# Patient Record
Sex: FEMALE | Race: BLACK OR AFRICAN AMERICAN | NOT HISPANIC OR LATINO | Employment: FULL TIME | URBAN - NONMETROPOLITAN AREA
[De-identification: names, ages, dates, MRNs, and addresses within clinical notes are randomized per-mention and may not be internally consistent; named-entity substitution may affect disease eponyms.]

---

## 2017-12-11 ENCOUNTER — NON-PROVIDER VISIT (OUTPATIENT)
Dept: URGENT CARE | Facility: PHYSICIAN GROUP | Age: 62
End: 2017-12-11

## 2017-12-11 DIAGNOSIS — Z02.1 PRE-EMPLOYMENT DRUG SCREENING: ICD-10-CM

## 2017-12-11 PROCEDURE — 8907 PR URINE COLLECT ONLY: Performed by: NURSE PRACTITIONER

## 2018-03-07 ENCOUNTER — OFFICE VISIT (OUTPATIENT)
Dept: URGENT CARE | Facility: PHYSICIAN GROUP | Age: 63
End: 2018-03-07
Payer: COMMERCIAL

## 2018-03-07 VITALS
HEART RATE: 96 BPM | OXYGEN SATURATION: 95 % | WEIGHT: 238.5 LBS | DIASTOLIC BLOOD PRESSURE: 80 MMHG | HEIGHT: 68 IN | TEMPERATURE: 97.3 F | SYSTOLIC BLOOD PRESSURE: 124 MMHG | RESPIRATION RATE: 16 BRPM | BODY MASS INDEX: 36.15 KG/M2

## 2018-03-07 DIAGNOSIS — E66.9 OBESITY (BMI 30-39.9): ICD-10-CM

## 2018-03-07 DIAGNOSIS — L50.9 HIVES: Primary | ICD-10-CM

## 2018-03-07 PROCEDURE — 99204 OFFICE O/P NEW MOD 45 MIN: CPT | Mod: 25 | Performed by: PHYSICIAN ASSISTANT

## 2018-03-07 RX ORDER — METHYLPREDNISOLONE 4 MG/1
4 TABLET ORAL SEE ADMIN INSTRUCTIONS
Qty: 21 TAB | Refills: 0 | Status: SHIPPED | OUTPATIENT
Start: 2018-03-07 | End: 2022-01-19

## 2018-03-07 RX ORDER — LEVOTHYROXINE SODIUM 0.03 MG/1
25 TABLET ORAL
COMMUNITY

## 2018-03-07 RX ORDER — FLUOXETINE HYDROCHLORIDE 20 MG/1
20 CAPSULE ORAL DAILY
COMMUNITY
End: 2022-01-19

## 2018-03-07 RX ORDER — HYDROXYZINE HYDROCHLORIDE 25 MG/1
25 TABLET, FILM COATED ORAL 3 TIMES DAILY PRN
Qty: 30 TAB | Refills: 0 | Status: SHIPPED | OUTPATIENT
Start: 2018-03-07 | End: 2022-01-19

## 2018-03-07 RX ORDER — ESTRADIOL 2 MG/1
2 TABLET ORAL DAILY
COMMUNITY
End: 2022-01-19

## 2018-03-07 RX ORDER — DEXAMETHASONE SODIUM PHOSPHATE 10 MG/ML
10 INJECTION INTRAMUSCULAR; INTRAVENOUS ONCE
Status: COMPLETED | OUTPATIENT
Start: 2018-03-07 | End: 2018-03-07

## 2018-03-07 RX ADMIN — DEXAMETHASONE SODIUM PHOSPHATE 10 MG: 10 INJECTION INTRAMUSCULAR; INTRAVENOUS at 17:22

## 2018-03-08 NOTE — PROGRESS NOTES
Chief Complaint   Patient presents with   • Itching     skin- might be allergic to something?       HISTORY OF PRESENT ILLNESS: Patient is a 62 y.o. female who presents today because she has itching and hives over the last 24-48 hours. Is not sure what is caused it, she cannot identify any new soaps, lotions, detergents or contacts. However, she is fairly new to the area and she is from [place] where there is much more humidity and notices that her skin has been much drier than usual here. She went to the emergency room last night and was given some Benadryl but it made her very, very sleepy and she is hesitant to take any more    Patient Active Problem List    Diagnosis Date Noted   • Obesity (BMI 30-39.9) 03/07/2018       Allergies:Doxycycline    Current Outpatient Prescriptions Ordered in [facility]   Medication Sig Dispense Refill   • levothyroxine (SYNTHROID) 25 MCG Tab Take 25 mcg by mouth Every morning on an empty stomach.     • estradiol (ESTRACE) 2 MG Tab Take 2 mg by mouth every day.     • FLUoxetine (PROZAC) 20 MG Cap Take 20 mg by mouth every day.     • hydrOXYzine HCl (ATARAX) 25 MG Tab Take 1 Tab by mouth 3 times a day as needed. 30 Tab 0   • MethylPREDNISolone (MEDROL DOSEPAK) 4 MG Tablet Therapy Pack Take 1 Tab by mouth See Admin Instructions. 21 Tab 0     Current Facility-Administered Medications Ordered in Epic   Medication Dose Route Frequency Provider Last Rate Last Dose   • dexamethasone (DECADRON) injection (check route below) 10 mg  10 mg Intramuscular Once Jesus Cross P.A.-C.           History reviewed. No pertinent past medical history.    Social History   Substance Use Topics   • Smoking status: Never Smoker   • Smokeless tobacco: Never Used   • Alcohol use Yes      Comment: occ       No family status information on file.   History reviewed. No pertinent family history.    ROS:  Review of Systems   Constitutional: Negative for fever, chills, weight loss and malaise/fatigue.   HENT:  "Negative for ear pain, nosebleeds, congestion, sore throat and neck pain.    Eyes: Negative for blurred vision.   Respiratory: Negative for cough, sputum production, shortness of breath and wheezing.    Cardiovascular: Negative for chest pain, palpitations, orthopnea and leg swelling.   Gastrointestinal: Negative for heartburn, nausea, vomiting and abdominal pain.   Genitourinary: Negative for dysuria, urgency and frequency.     Exam:  Blood pressure 124/80, pulse 96, temperature 36.3 °C (97.3 °F), resp. rate 16, height 1.727 m (5' 8\"), weight 108.2 kg (238 lb 8 oz), SpO2 95 %.  General:  Well nourished, well developed female in NAD  Head:Normocephalic, atraumatic  Eyes: PERRLA, EOM within normal limits, no conjunctival injection, no scleral icterus, visual fields and acuity grossly intact.  Nose: Symmetrical without tenderness, no discharge.  Mouth: reasonable hygiene, no erythema exudates or tonsillar enlargement.  Neck: no masses, range of motion within normal limits, no tracheal deviation. No obvious thyroid enlargement.  Extremities: no clubbing, cyanosis, or edema.  . Skin: Her skin is very dry in general. On her flanks, abdomen and back, She has multiple areas of edematous macular lesions consistent with hives ranging from 1 cm to 4 cm in diameter with irregular borders    Please note that this dictation was created using voice recognition software. I have made every reasonable attempt to correct obvious errors, but I expect that there are errors of grammar and possibly content that I did not discover before finalizing the note.    Assessment/Plan:  1. Hives  hydrOXYzine HCl (ATARAX) 25 MG Tab    dexamethasone (DECADRON) injection (check route below) 10 mg    MethylPREDNISolone (MEDROL DOSEPAK) 4 MG Tablet Therapy Pack   2. Obesity (BMI 30-39.9)  Patient identified as having weight management issue.  Appropriate orders and counseling given.       Followup with primary care in the next 7-10 days if not " significantly improving, return to the urgent care or go to the emergency room sooner for any worsening of symptoms.

## 2018-04-16 ENCOUNTER — HOSPITAL ENCOUNTER (OUTPATIENT)
Dept: RADIOLOGY | Facility: MEDICAL CENTER | Age: 63
End: 2018-04-16

## 2018-05-09 ENCOUNTER — HOSPITAL ENCOUNTER (OUTPATIENT)
Dept: RADIOLOGY | Facility: MEDICAL CENTER | Age: 63
End: 2018-05-09
Attending: FAMILY MEDICINE
Payer: COMMERCIAL

## 2018-05-09 ENCOUNTER — HOSPITAL ENCOUNTER (OUTPATIENT)
Dept: RADIOLOGY | Facility: MEDICAL CENTER | Age: 63
End: 2018-05-09

## 2018-05-09 DIAGNOSIS — R92.8 ABNORMAL MAMMOGRAM: ICD-10-CM

## 2018-08-15 ENCOUNTER — APPOINTMENT (RX ONLY)
Dept: URBAN - NONMETROPOLITAN AREA CLINIC 15 | Facility: CLINIC | Age: 63
Setting detail: DERMATOLOGY
End: 2018-08-15

## 2018-08-15 DIAGNOSIS — L28.0 LICHEN SIMPLEX CHRONICUS: ICD-10-CM

## 2018-08-15 DIAGNOSIS — L29.89 OTHER PRURITUS: ICD-10-CM

## 2018-08-15 DIAGNOSIS — L29.8 OTHER PRURITUS: ICD-10-CM

## 2018-08-15 DIAGNOSIS — L65.9 NONSCARRING HAIR LOSS, UNSPECIFIED: ICD-10-CM

## 2018-08-15 PROCEDURE — ? COUNSELING

## 2018-08-15 PROCEDURE — 99202 OFFICE O/P NEW SF 15 MIN: CPT | Mod: 25

## 2018-08-15 PROCEDURE — 11100: CPT

## 2018-08-15 PROCEDURE — ? PRESCRIPTION

## 2018-08-15 PROCEDURE — ? ORDER TESTS

## 2018-08-15 PROCEDURE — ? BIOPSY BY PUNCH METHOD

## 2018-08-15 RX ORDER — CLOBETASOL PROPIONATE 0.46 MG/ML
SOLUTION TOPICAL
Qty: 1 | Refills: 3 | Status: ERX | COMMUNITY
Start: 2018-08-15

## 2018-08-15 RX ADMIN — CLOBETASOL PROPIONATE: 0.46 SOLUTION TOPICAL at 15:46

## 2018-08-15 ASSESSMENT — LOCATION SIMPLE DESCRIPTION DERM
LOCATION SIMPLE: LEFT SCALP
LOCATION SIMPLE: SCALP
LOCATION SIMPLE: POSTERIOR SCALP

## 2018-08-15 ASSESSMENT — LOCATION DETAILED DESCRIPTION DERM
LOCATION DETAILED: LEFT MEDIAL FRONTAL SCALP
LOCATION DETAILED: POSTERIOR MID-PARIETAL SCALP
LOCATION DETAILED: LEFT SUPERIOR PARIETAL SCALP

## 2018-08-15 ASSESSMENT — LOCATION ZONE DERM: LOCATION ZONE: SCALP

## 2018-08-15 NOTE — PROCEDURE: BIOPSY BY PUNCH METHOD
Was A Bandage Applied: Yes
Detail Level: Detailed
Punch Size In Mm: 4
Biopsy Type: H and E
Anesthesia Type: 1% lidocaine with epinephrine
Anesthesia Volume In Cc: 0.5
Additional Anesthesia Volume In Cc (Will Not Render If 0): 0
Hemostasis: None
Epidermal Sutures: 4-0 Polysorb
Wound Care: Vaseline
Number Of Epidermal Sutures (Optional): 3
Dressing: bandage
Suture Removal: 14 days
Lab: [id number]
Lab Facility: 
Patient Will Remove Sutures At Home?: No
Consent: Written consent was obtained and risks were reviewed including but not limited to scarring, infection, bleeding, scabbing, incomplete removal, nerve damage and allergy to anesthesia.
Post-Care Instructions: I reviewed with the patient in detail post-care instructions. Patient is to keep the biopsy site dry overnight, and then apply bacitracin twice daily until healed. Patient may apply hydrogen peroxide soaks to remove any crusting.
Home Suture Removal Text: Patient was provided a home suture removal kit and will remove their sutures at home.  If they have any questions or difficulties they will call the office.
Notification Instructions: Patient will be notified of biopsy results. However, patient instructed to call the office if not contacted within 2 weeks.
Billing Type: Third-Party Bill

## 2018-08-15 NOTE — PROCEDURE: COUNSELING
Patient Specific Counseling (Will Not Stick From Patient To Patient): Pt. reports that she take thyroid replacement as she needs.  Discussed stress related hair loss, traction alopecia and generalized hair loss and causes.
Detail Level: Zone
Detail Level: Generalized

## 2021-04-23 ENCOUNTER — APPOINTMENT (OUTPATIENT)
Dept: RADIOLOGY | Facility: MEDICAL CENTER | Age: 66
End: 2021-04-23
Attending: NURSE PRACTITIONER
Payer: COMMERCIAL

## 2021-09-28 ENCOUNTER — NON-PROVIDER VISIT (OUTPATIENT)
Dept: URGENT CARE | Facility: PHYSICIAN GROUP | Age: 66
End: 2021-09-28

## 2021-09-28 DIAGNOSIS — Z11.1 PPD SCREENING TEST: ICD-10-CM

## 2021-09-28 PROCEDURE — 86580 TB INTRADERMAL TEST: CPT | Performed by: PHYSICIAN ASSISTANT

## 2021-09-28 NOTE — NON-PROVIDER
Caroline Silva is a 65 y.o. female here for a non-provider visit for PPD placement -- Step 1 of 1    Reason for PPD:  work requirement    1. TB evaluation questionnaire completed by patient? Yes      -  If any answers marked yes did you contact a provider prior to placing? Not Indicated  2.  Patient notified to return to clinic for reading on: yes  3.  PPD Placement documentation completed on TB evaluation questionnaire? Yes  4.  Location of TB evaluation questionnaire filed: in file on counter

## 2021-09-30 ENCOUNTER — OFFICE VISIT (OUTPATIENT)
Dept: URGENT CARE | Facility: PHYSICIAN GROUP | Age: 66
End: 2021-09-30

## 2021-09-30 ENCOUNTER — NON-PROVIDER VISIT (OUTPATIENT)
Dept: URGENT CARE | Facility: PHYSICIAN GROUP | Age: 66
End: 2021-09-30

## 2021-09-30 LAB — TB WHEAL 3D P 5 TU DIAM: NORMAL MM

## 2021-09-30 NOTE — NON-PROVIDER
Caroline Silva is a 65 y.o. female here for a non-provider visit for PPD reading -- Step 1 of 1.      1.  Resulted in Epic under enter/edit results? Yes   2.  TB evaluation questionnaire scanned into chart and original given to patient?Yes      3. Was induration greater than 0 mm? No.    If Step 1 of 2, when is patient returning for second step (delete if N/A): n/a

## 2022-01-19 ENCOUNTER — OFFICE VISIT (OUTPATIENT)
Dept: URGENT CARE | Facility: PHYSICIAN GROUP | Age: 67
End: 2022-01-19

## 2022-01-19 VITALS
RESPIRATION RATE: 14 BRPM | WEIGHT: 230 LBS | HEIGHT: 68 IN | TEMPERATURE: 97.9 F | BODY MASS INDEX: 34.86 KG/M2 | HEART RATE: 88 BPM | OXYGEN SATURATION: 97 % | DIASTOLIC BLOOD PRESSURE: 78 MMHG | SYSTOLIC BLOOD PRESSURE: 122 MMHG

## 2022-01-19 DIAGNOSIS — U07.1 COVID-19: ICD-10-CM

## 2022-01-19 PROCEDURE — 99213 OFFICE O/P EST LOW 20 MIN: CPT | Performed by: FAMILY MEDICINE

## 2022-01-19 NOTE — PROGRESS NOTES
+ COVID PCR test on 1/13          CC:  cough        Cough  This is a new problem. The current episode started 7 days ago. The problem has been unchanged. The problem occurs constantly. The cough is dry. Associated symptoms include : fatigue, sob, subj fever, but reports sx overall are improved. Pertinent negatives include no   headaches, nausea, vomiting, diarrhea, sweats, weight loss or wheezing. Nothing aggravates the symptoms.  Patient has tried nothing for the symptoms. There is no history of asthma.        No past medical history on file.      Social History     Tobacco Use   • Smoking status: Never Smoker   • Smokeless tobacco: Never Used   Vaping Use   • Vaping Use: Never used   Substance Use Topics   • Alcohol use: Yes     Comment: occ   • Drug use: No         Current Outpatient Medications on File Prior to Visit   Medication Sig Dispense Refill   • levothyroxine (SYNTHROID) 25 MCG Tab Take 25 mcg by mouth Every morning on an empty stomach.     • estradiol (ESTRACE) 2 MG Tab Take 2 mg by mouth every day. (Patient not taking: Reported on 1/19/2022)     • FLUoxetine (PROZAC) 20 MG Cap Take 20 mg by mouth every day. (Patient not taking: Reported on 1/19/2022)     • hydrOXYzine HCl (ATARAX) 25 MG Tab Take 1 Tab by mouth 3 times a day as needed. (Patient not taking: Reported on 1/19/2022) 30 Tab 0   • MethylPREDNISolone (MEDROL DOSEPAK) 4 MG Tablet Therapy Pack Take 1 Tab by mouth See Admin Instructions. (Patient not taking: Reported on 1/19/2022) 21 Tab 0     No current facility-administered medications on file prior to visit.                    Review of Systems   Constitutional: + for fever    HENT: negative for otalgia  Cardiovascular - denies chest pain    Respiratory: Positive for cough.  .  Negative for wheezing.    Neurological: Negative for headaches.   GI - denies nausea, vomiting or diarrhea  Neuro - denies numbness or tingling.            Objective:     /78   Pulse 88   Temp 36.6 °C (97.9  "°F) (Temporal)   Resp 14   Ht 1.715 m (5' 7.5\")   Wt 104 kg (230 lb)   SpO2 97%     Physical Exam   Constitutional: patient is oriented to person, place, and time. Patient appears well-developed and well-nourished. No distress.   HENT:   Head: Normocephalic and atraumatic.   Right Ear: External ear normal.   Left Ear: External ear normal.   Nose: Mucosal edema  present. Right sinus exhibits no maxillary sinus tenderness. Left sinus exhibits no maxillary sinus tenderness.   Mouth/Throat: Mucous membranes are normal. No oral lesions.  No posterior pharyngeal erythema.  No oropharyngeal exudate or posterior oropharyngeal edema.   Eyes: Conjunctivae and EOM are normal. Pupils are equal, round, and reactive to light. Right eye exhibits no discharge. Left eye exhibits no discharge. No scleral icterus.   Neck: Normal range of motion. Neck supple. No tracheal deviation present.   Cardiovascular: Normal rate, regular rhythm and normal heart sounds.  Exam reveals no friction rub.    Pulmonary/Chest: Effort normal. No respiratory distress. Patient has no wheezes or rhonchi. Patient has no rales.    Musculoskeletal:  exhibits no edema.   Lymphadenopathy:     Patient has no cervical adenopathy.      Neurological: patient is alert and oriented to person, place, and time.   Skin: Skin is warm and dry. No rash noted. No erythema.   Psychiatric: patient  has a normal mood and affect.  behavior is normal.   Nursing note and vitals reviewed.              Assessment/Plan:       1. COVID-19   pt is not hypoxic  cxr ordered to r/o pneumonia.     Will call with results      - DX-CHEST-2 VIEWS; Future       Follow up in one week if no improvement, sooner if symptoms worsen.     "